# Patient Record
Sex: MALE | Race: WHITE | NOT HISPANIC OR LATINO | URBAN - METROPOLITAN AREA
[De-identification: names, ages, dates, MRNs, and addresses within clinical notes are randomized per-mention and may not be internally consistent; named-entity substitution may affect disease eponyms.]

---
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
## 2022-12-19 ENCOUNTER — HOSPITAL ENCOUNTER (OUTPATIENT)
Dept: PHYSICAL THERAPY | Age: 22
Setting detail: RECURRING SERIES
Discharge: HOME | End: 2022-12-22
Payer: COMMERCIAL

## 2022-12-19 PROCEDURE — 97161 PT EVAL LOW COMPLEX 20 MIN: CPT

## 2022-12-19 NOTE — THERAPY EVALUATION
on file. Mr. Rubén Martinez  has no past surgical history on file. Social History/Living Environment:   Lives With: Family  Type of Home: House     Prior Level of Function/Work/Activity:   Prior level of function: functioned indedpendently without limitaitons. Learning:   Does the patient/guardian have any barriers to learning?: No barriers  Will there be a co-learner?: No  What is the preferred language of the patient/guardian?: English  Is an  required?: No  How does the patient/guardian prefer to learn new concepts?: Listening; Reading; Demonstration     Fall Risk Scale: Pedro Total Score: 0  Pedro Fall Risk: Low (0-24)           OBJECTIVE   Observation/Posture: Rounded shoulders    Palpation: tender to palpation of the R subscapularis     ROM:       RIGHT LEFT    Shoulder flexion  155 160     Shoulder IR 35 53    Shoulder external rotation  82  85          Strength:       RIGHT  LEFT     Shoulder flexion  5/5 5/5     Shoulder ER  5/5  5/5    Shoulder IR 5/5 5/5    Scapular retraction  4/5  4/5          Functional Mobility:       RIGHT LEFT    Apley IR  WNL (winging of scapula) WNL     Apley ER  WNL  WNL    Push  --  --    Pull -- --    Carry  -- --           Joint Mobility:        RIGHT LEFT       normal normal             ASSESSMENT   Initial Assessment:  Upon PT assessment, Pt. Demonstrates reduced ROM in the R shoulder compared to the L side especially with internal rotation. Flexibility limitations are present in the pectoralis and subscapularis musculature. Myofascial restriction is present through the lateral scapular musculature and pectoralis musculature. Strength and motor control deficits can be seen in the scapular musculature and pt.  Requires verbal and manual cueing for appropriate performance of initial exercise program.  Pt. Will benefit from manual therapy, flexibility exercises and motor control/strengthening in the rotator cuff and scapular musculature to improve subjective complaints. HEP provided including stretching, scapular protraction, I's and T's  Problem List: (Impacting functional limitations): Body Structures, Functions, Activity Limitations Requiring Skilled Therapeutic Intervention: Decreased functional mobility ; Decreased ROM; Decreased body mechanics; Decreased strength; Decreased endurance; Decreased posture     Therapy Prognosis:   Therapy Prognosis: Good     Initial Assessment Complexity: Decision Making: Low Complexity    PLAN   Effective Dates: 12/19/2022 TO Plan of Care/Certification Expiration Date: 01/18/23   Frequency/Duration: Plan Frequency: 2x a week for 30 days   Interventions Planned (Treatment may consist of any combination of the following):          Goals: (Goals have been discussed and agreed upon with patient.)    Discharge Goals: Time Frame: 4 weeks  Pt. Will demonstrate > 50 degrees of R shoulder IR to improve ROM. Pt. Will demonstrate > 4+/5 MMT for scapular retraction to improve strength. Pt. Will be independent with HEP to prevent return of symptoms. Pt. Will complete full chest and back workout without pain or limitation to improve recreational capacity. Outcome Measure: Tool Used: Disabilities of the Arm, Shoulder and Hand (DASH) Questionnaire - Quick Version  Score:  Initial: 14/55  Most Recent: X/55 (Date: -- )   Interpretation of Score: The DASH is designed to measure the activities of daily living in person's with upper extremity dysfunction or pain. Each section is scored on a 1-5 scale, 5 representing the greatest disability. The scores of each section are added together for a total score of 55. Medical Necessity:   > Patient is expected to demonstrate progress in strength and range of motion to increase independence with ADL's and recreational capacity.   Reason For Services/Other Comments:  Patient will benefit from skilled PT to address impairments identified through evaluation and return to previous ADL and

## 2023-01-03 ENCOUNTER — HOSPITAL ENCOUNTER (OUTPATIENT)
Dept: PHYSICAL THERAPY | Age: 23
Setting detail: RECURRING SERIES
Discharge: HOME | End: 2023-01-06
Payer: COMMERCIAL

## 2023-01-03 PROCEDURE — 97110 THERAPEUTIC EXERCISES: CPT

## 2023-01-03 PROCEDURE — 97140 MANUAL THERAPY 1/> REGIONS: CPT

## 2023-01-03 NOTE — PROGRESS NOTES
Sondra [place]  : 2000  Primary: Kermitt Schlatter Sc ([facility])  Secondary:  [address] @ [facility] [address]  Phone: [phone]  Fax: [phone] Plan Frequency: 2x a week for 30 days    Plan of Care/Certification Expiration Date: 23      PT Visit Info:  Plan Frequency: 2x a week for 30 days  Plan of Care/Certification Expiration Date: 23     Visit Count:  2   OUTPATIENT PHYSICAL THERAPY:OP NOTE TYPE: Treatment Note 1/3/2023       Episode  }Appt Desk             Treatment Diagnosis:  Stiffness of Right Shoulder, Not elsewhere classified (M25.611)  Medical/Referring Diagnosis:  Right shoulder strain [Q84.654S]  Referring Physician:  Referral, Self  MD Orders:  N/A  Date of Onset:  No data recorded   Allergies:   Patient has no allergy information on record. Restrictions/Precautions:  Restrictions/Precautions: None  No data recorded     Interventions Planned (Treatment may consist of any combination of the following):    No data recorded     Subjective Comments: Pt. Reports good compliance with HEP over the past week. Initial:}     0/10Post Session:        0/10  Medications Last Reviewed:  1/3/2023  Updated Objective Findings:  None Today  Treatment   THERAPEUTIC EXERCISE: (30 minutes):    Exercises per grid below to improve mobility and strength. Required minimal visual, verbal, and manual cues to promote proper body alignment, promote proper body posture, and promote proper body mechanics. Progressed resistance, range, and repetitions as indicated. Date:  1/3/2023   Activity/Exercise Parameters   I's Y's, T's 3 x 10   Push up 3 x 5   Serratus rolls 50 reps   Self pectoralis release/pec stretch 4 minutes   Lower trapezius exercise 3 x 10   Sidelying shoulder ER 3 x 10   Serratus punch (15#) 3 x 10     Manual Therapy (15  Minutes): Manual techniques to facilitate improved motion and decrease pain.  (Used abbreviations; PNF - proprioceptive neuromuscular facilitation; a/p - anterior to posterior; p/a - posterior to anterior; cpa - central posterior anterior; upa -unilateral posterior anterior; SAL- superior anterior lateral glide; HVLAT - High Velocity Low Amplitude Thrust)  Soft tissue mobilization: R pectoralis, subscapularis, lateral scapular musculature. Joint mobilization: thoracic spine grade V supine   Joint mobilization: R rib manipulation T3/4    Dry Needling (5 minutes): R pectoralis major, subscapularis, latissimus dorsi. Treatment/Session Summary:    Treatment Assessment: Pt. Demonstrates signs of motor control deficits in the R scapular and rotator cuff musculature during therapeutic exercises today. Pt. Will benefit from strengthening and motor control exercises in the R UE to improve symptomatic complaints. Dry needling techniques are utilized today without complaints and minimal post treatment soreness. Communication/Consultation:  None today  Equipment provided today:  None  Recommendations/Intent for next treatment session: Next visit will focus on R UE motor control and strengthening. Total Treatment Billable Duration:  50 minutes total time.    Time In: 1430  Time Out: [address], PT       Charge Capture  }Post Session Pain  PT Visit Info  [facility] Portal  MD Guidelines  Scanned Media  Benefits  MyChart    Future Appointments   Date Time Provider Petrona Jacobs   1/5/2023  7:30 AM Luma Stevenson PT [facility] No Residual Tumor Seen Histology Text: There were no malignant cells seen in the sections examined.

## 2023-01-05 ENCOUNTER — HOSPITAL ENCOUNTER (OUTPATIENT)
Dept: PHYSICAL THERAPY | Age: 23
Setting detail: RECURRING SERIES
Discharge: HOME | End: 2023-01-08
Payer: COMMERCIAL

## 2023-01-05 PROCEDURE — 97110 THERAPEUTIC EXERCISES: CPT

## 2023-01-05 PROCEDURE — 97140 MANUAL THERAPY 1/> REGIONS: CPT

## 2023-01-05 NOTE — PROGRESS NOTES
Sigrid Turner  : 2000  Primary: Emil Chou ([facility]  Secondary:  [address] @ [place] [address]  Phone: [phone]  Fax: [phone] Plan Frequency: 2x a week for 30 days    Plan of Care/Certification Expiration Date: 23      PT Visit Info:  Plan Frequency: 2x a week for 30 days  Plan of Care/Certification Expiration Date: 23     Visit Count:  3   OUTPATIENT PHYSICAL THERAPY:OP NOTE TYPE: Treatment Note 2023       Episode  }Appt Desk             Treatment Diagnosis:  Stiffness of Right Shoulder, Not elsewhere classified (M25.611)  Medical/Referring Diagnosis:  Right shoulder strain [W23.409P]  Referring Physician:  Referral, Self  MD Orders:  N/A  Date of Onset:  No data recorded   Allergies:   Patient has no allergy information on record. Restrictions/Precautions:  Restrictions/Precautions: None  No data recorded     Interventions Planned (Treatment may consist of any combination of the following):    No data recorded     Subjective Comments: Pt. Denies new complaints this week and mild soreness following last PT session      Initial:}     0/10Post Session:        0/10  Medications Last Reviewed:  2023  Updated Objective Findings:  None Today  Treatment   THERAPEUTIC EXERCISE: (35 minutes):    Exercises per grid below to improve mobility and strength. Required minimal visual, verbal, and manual cues to promote proper body alignment, promote proper body posture, and promote proper body mechanics. Progressed resistance, range, and repetitions as indicated.    Date:  2023   Activity/Exercise Parameters   I's Y's, T's 3 x 10   Push up --   Serratus rolls --   Self pectoralis release/pec stretch --   Lower trapezius exercise 3 x 10   Sidelying shoulder ER (2#) 3 x 10   Serratus punch (15#) 3 x 10   Serratus walk out on therapy ball 5 minutes   Single UE cable row (30#) 3 x 10   Single UE cable press (20#) 3 x 10   Scapular retraction with ER (orange band) 3 x 10     Manual Therapy (10  Minutes): Manual techniques to facilitate improved motion and decrease pain. (Used abbreviations; PNF - proprioceptive neuromuscular facilitation; a/p - anterior to posterior; p/a - posterior to anterior; cpa - central posterior anterior; upa -unilateral posterior anterior; SAL- superior anterior lateral glide; HVLAT - High Velocity Low Amplitude Thrust)  Soft tissue mobilization: R pectoralis, subscapularis, lateral scapular musculature. Joint mobilization: thoracic spine grade V supine (NOT PERFORMED)  Joint mobilization: R rib manipulation T3/4 (NOT PERFORMED)    Dry Needling (0 minutes): R pectoralis major, subscapularis, latissimus dorsi. (NOT PERFORMED)    Treatment/Session Summary:    Treatment Assessment: Pt. Tolerates upper quarter motor control and strengthening exercises today without complaints. R upper trapezius compensation remains present with upper quarter exercises. Communication/Consultation:  None today  Equipment provided today:  None  Recommendations/Intent for next treatment session: Next visit will focus on R UE motor control and strengthening. Total Treatment Billable Duration:  45 minutes total time.    Time In: 0730  Time Out: 0830    Kodak Lantigua, PT       Charge Capture  }Post Session Pain  PT Visit Info  [facility] Portal  MD Guidelines  Scanned Media  Benefits  MyChart    Future Appointments   Date Time Provider Petrona Jacobs   1/12/2023  1:30 PM ROLANDO Cervantes [facility] [place][note 2]   1/16/2023  8:30 AM ROLANDO Cervantes [facility] [place]

## 2023-01-12 ENCOUNTER — APPOINTMENT (OUTPATIENT)
Dept: PHYSICAL THERAPY | Age: 23
End: 2023-01-12
Payer: COMMERCIAL

## 2023-01-13 ENCOUNTER — HOSPITAL ENCOUNTER (OUTPATIENT)
Dept: PHYSICAL THERAPY | Age: 23
Setting detail: RECURRING SERIES
Discharge: HOME | End: 2023-01-16
Payer: COMMERCIAL

## 2023-01-13 PROCEDURE — 97110 THERAPEUTIC EXERCISES: CPT

## 2023-01-13 PROCEDURE — 97140 MANUAL THERAPY 1/> REGIONS: CPT

## 2023-01-13 NOTE — PROGRESS NOTES
Bandar Meir  : 2000  Primary: tSeph Chou ([facility])  Secondary:  [address] @ [place]  [facility] [address]  Phone: [phone]  Fax: [phone] Plan Frequency: 2x a week for 30 days    Plan of Care/Certification Expiration Date: 23      PT Visit Info:  Plan Frequency: 2x a week for 30 days  Plan of Care/Certification Expiration Date: 23     Visit Count:  4   OUTPATIENT PHYSICAL THERAPY:OP NOTE TYPE: Treatment Note 2023       Episode  }Appt Desk             Treatment Diagnosis:  Stiffness of Right Shoulder, Not elsewhere classified (M25.611)  Medical/Referring Diagnosis:  Right shoulder strain [G61.112X]  Referring Physician:  Referral, Self  MD Orders:  N/A  Date of Onset:  No data recorded   Allergies:   Patient has no allergy information on record. Restrictions/Precautions:  Restrictions/Precautions: None  No data recorded     Interventions Planned (Treatment may consist of any combination of the following):    No data recorded     Subjective Comments: Pt states he is better but feels like he has plateaued a bit     Initial:}     0/10Post Session:        0/10  Medications Last Reviewed:  2023  Updated Objective Findings:  None Today  Treatment   THERAPEUTIC EXERCISE: (35 minutes):    Exercises per grid below to improve mobility and strength. Required minimal visual, verbal, and manual cues to promote proper body alignment, promote proper body posture, and promote proper body mechanics. Progressed resistance, range, and repetitions as indicated.    Date:  2023   Activity/Exercise Parameters   I's Y's, T's 3 x 10 with tactile cueing    Push up --   Serratus rolls --   Self pectoralis release/pec stretch --   Lower trapezius exercise 3 x 10   Sidelying shoulder ER (5#) 3 x 10   Serratus punch (15#) 3 x 10   Serratus walk out on therapy ball    Single UE cable row (30#)    Single UE cable press (20#)    Scapular retraction with ER (orange band)    Lat stretch 3 x 20 sec, serratus hugs with green sports cord 3 x 10,   Manual Therapy (10  Minutes): Manual techniques to facilitate improved motion and decrease pain. (Used abbreviations; PNF - proprioceptive neuromuscular facilitation; a/p - anterior to posterior; p/a - posterior to anterior; cpa - central posterior anterior; upa -unilateral posterior anterior; SAL- superior anterior lateral glide; HVLAT - High Velocity Low Amplitude Thrust)  Soft tissue mobilization: R pectoralis, subscapularis, lateral scapular musculature. Joint mobilization: thoracic spine grade V supine (NOT PERFORMED)  Joint mobilization: R rib manipulation T3/4 (NOT PERFORMED)    Dry Needling (0 minutes): R pectoralis major, subscapularis, latissimus dorsi. (NOT PERFORMED)    Treatment/Session Summary:    Treatment Assessment: Pt doing well with strengthening. He continues to have some substitution of upper trap and reports tightness in shoulder. Communication/Consultation:  None today  Equipment provided today:  None  Recommendations/Intent for next treatment session: Next visit will focus on R UE motor control and strengthening. Total Treatment Billable Duration:  45 minutes total time.    Time In: 1200  Time Out: 1255    Orion Santiago PT       Charge Capture  }Post Session Pain  PT Visit [facility] Portal  MD Guidelines  Scanned Media  Benefits  MyChart    Future Appointments   Date Time Provider Petrona Jacobs   1/16/2023  8:30 AM Martha Ruiz PT [facility]

## 2023-01-16 ENCOUNTER — HOSPITAL ENCOUNTER (OUTPATIENT)
Dept: PHYSICAL THERAPY | Age: 23
Setting detail: RECURRING SERIES
Discharge: HOME | End: 2023-01-19
Payer: COMMERCIAL

## 2023-01-16 PROCEDURE — 97140 MANUAL THERAPY 1/> REGIONS: CPT

## 2023-01-16 PROCEDURE — 97110 THERAPEUTIC EXERCISES: CPT

## 2023-01-16 NOTE — PROGRESS NOTES
Stuart Matson  : 2000  Primary: Clive Chou ([facility])  Secondary:  [address] @ [place] [address]  Phone: [phone]  Fax: [phone] Plan Frequency: 2x a week for 30 days    Plan of Care/Certification Expiration Date: 23      PT Visit Info:  Plan Frequency: 2x a week for 30 days  Plan of Care/Certification Expiration Date: 23     Visit Count:  5   OUTPATIENT PHYSICAL THERAPY:OP NOTE TYPE: Treatment Note 2023       Episode  }Appt Desk             Treatment Diagnosis:  Stiffness of Right Shoulder, Not elsewhere classified (M25.611)  Medical/Referring Diagnosis:  Right shoulder strain [M08.279Z]  Referring Physician:  Referral, Self  MD Orders:  N/A  Date of Onset:  No data recorded   Allergies:   Patient has no allergy information on record. Restrictions/Precautions:  Restrictions/Precautions: None  No data recorded     Interventions Planned (Treatment may consist of any combination of the following):    No data recorded     Subjective Comments: Pt reports some progress with exercise but remains limited during pressing/pulling activities in the gym using the R UE. Initial:}     0/10Post Session:        010  Medications Last Reviewed:  2023  Updated Objective Findings:   R shoulder IR at 90 degrees of abduction is 43 degrees today. Treatment   THERAPEUTIC EXERCISE: (35 minutes):    Exercises per grid below to improve mobility and strength. Required minimal visual, verbal, and manual cues to promote proper body alignment, promote proper body posture, and promote proper body mechanics. Progressed resistance, range, and repetitions as indicated.    Date:  2023   Activity/Exercise Parameters   I's Y's, T's 3 x 10 with tactile cueing  (1#)   Push up 3 x 6   Serratus rolls --   Self pectoralis release/pec stretch --   Lower trapezius exercise 3 x 10   Sidelying shoulder ER (5#) 3 x 10   Serratus punch (15#) 3 x 10 Serratus walk out on therapy ball    Single UE cable row (30#) 3 x 10   Single UE cable press (20#)    Scapular retraction with ER (orange band)    plank 3 minutes   Bear crawl 3 minutes   Lat stretch 3 x 20 sec, serratus hugs with green sports cord 3 x 10,   Manual Therapy (10  Minutes): Manual techniques to facilitate improved motion and decrease pain. (Used abbreviations; PNF - proprioceptive neuromuscular facilitation; a/p - anterior to posterior; p/a - posterior to anterior; cpa - central posterior anterior; upa -unilateral posterior anterior; SAL- superior anterior lateral glide; HVLAT - High Velocity Low Amplitude Thrust)  Soft tissue mobilization: R pectoralis, subscapularis, lateral scapular musculature. Joint mobilization: thoracic spine grade V supine (NOT PERFORMED)  Joint mobilization: R rib manipulation T3/4 (NOT PERFORMED)    Dry Needling (5 minutes): R subscapularis, latissimus dorsi. Treatment/Session Summary:    Treatment Assessment: Pt demonstrates improved R shoulder IR since initial evaluation. Evaluation of pressing activities, including push up, reveals improved scapular mechanics that deteriorate with increased repetitions. Pt. Is making expected progress with scpaular and rotator cuff strengthening. Pt. Will require physician referral for continued PT at this time. Pt. Is advised to continue HEP and consult with provider if he would like to continued current PT program.      Communication/Consultation:  None today  Equipment provided today:  None  Recommendations/Intent for next treatment session: Next visit will focus on R UE motor control and strengthening. Total Treatment Billable Duration:  50 minutes total time. Time In: 0830  Time Out: 0930    Geneva Cole PT       Charge Capture  }Post Session Pain  PT Visit Info  [employer] Portal  MD Guidelines  Scanned Media  Benefits  MyChart    No future appointments.

## 2023-07-27 ENCOUNTER — HOSPITAL ENCOUNTER (OUTPATIENT)
Dept: PHYSICAL THERAPY | Age: 23
Setting detail: RECURRING SERIES
Discharge: HOME | End: 2023-07-30
Payer: COMMERCIAL

## 2023-07-27 PROCEDURE — 97110 THERAPEUTIC EXERCISES: CPT

## 2023-07-27 PROCEDURE — 97161 PT EVAL LOW COMPLEX 20 MIN: CPT

## 2023-07-28 NOTE — PROGRESS NOTES
Maik Mendiola  : 2000  Primary: Arsen Cross Sc ([facility])  Secondary:  [address] @ [employer] [place]  [address] [address]  Phone: [phone]  Fax: [phone] Plan Frequency: 2-3 visits for review and update HEP    Plan of Care/Certification Expiration Date: 23      >PT Visit Info:  Plan Frequency: 2-3 visits for review and update HEP  Plan of Care/Certification Expiration Date: 23  Total # of Visits to Date: 1      Visit Count:  1    OUTPATIENT PHYSICAL THERAPY:OP NOTE TYPE: OP Note Type: Treatment Note 2023       Episode  }Appt Desk             Treatment Diagnosis:  Pain in Right Shoulder (M25.511)  Medical/Referring Diagnosis:  Pain in right shoulder [M25.511]  Other chronic pain [G89.29]  Referring Physician:  Nicol Colbert MD MD Orders:  PT Eval and Treat   Date of Onset: last year   Allergies:   Patient has no allergy information on record. Restrictions/Precautions:  Restrictions/Precautions: None  No data recorded   Interventions Planned (Treatment may consist of any combination of the following):    Current Treatment Recommendations: Home exercise program; Strengthening; Manual; Dry needling     >Subjective Comments: See Eval     >Initial:     0 /10>Post Session:    0    /10  Medications Last Reviewed:  2023  Updated Objective Findings:  See evaluation note from today  Treatment   THERAPEUTIC EXERCISE: (15 minutes):    Exercises to improve mobility and strength. Instructed patient in prone middle and lower trap with verbal and tactile cueing for scapula positioning. Attempted a sitting lat stretch with elbows at 90 deg on table and shoulder flexion but not able to feel a lat stretch so did not give as HEP. Also discussed adding rotator cuff strength to gym program at the start of his working out to help with strength and mobility of R shoulder.      Treatment/Session Summary:    >Treatment Assessment: Good demonstration

## 2023-07-28 NOTE — THERAPY EVALUATION
Adam Evangelista  : 2000  Primary: Moon Chou ([facility])  Secondary:  [address] @ [address] [place] [id number] [address] [facility] [address]  Phone: [phone]  Fax: [phone] Plan Frequency: 2-3 visits for review and update HEP    Plan of Care/Certification Expiration Date: 23      PT Visit Info:  Plan Frequency: 2-3 visits for review and update HEP  Plan of Care/Certification Expiration Date: 23  Total # of Visits to Date: 1      Visit Count:  1                OUTPATIENT PHYSICAL THERAPY:             OP NOTE TYPE: Initial Assessment 2023               Episode (R shoulder pain) Appt Desk         Treatment Diagnosis:  Pain in Right Shoulder (M25.511)  Medical/Referring Diagnosis:  Pain in right shoulder [M25.511]  Other chronic pain [G89.29]  Referring Physician:  Velia Sheffield MD MD Orders:  PT Eval and Treat   Return MD Appt:  TBD  Date of Onset:     Last 2022  Allergies:  Patient has no allergy information on record. Restrictions/Precautions:      none     Medications Last Reviewed:  2023     SUBJECTIVE   History of Injury/Illness (Reason for Referral):  Pt reports no pain in the shoulder. He feels like he has decreased R shoulder mobility. He feels like he has muscle tightness and the right shoulder feels different than the L shoulder with his gym exercises. Patient Stated Goal(s):  \"decrease tension, R shoulder feel like L shoulder with working out\"  Initial:     0 10 Post Session:     0 /10  Past Medical History/Comorbidities:   Mr. Keara Doherty  has no past medical history on file. Mr. Keara Doherty  has no past surgical history on file.   Social History/Living Environment:   Lives With: Family  Type of Home: House     Prior Level of Function/Work/Activity:   Prior level of function: Independent  Occupation: Full time employment  Type of Occupation:        Learning:   Does the patient/guardian have any barriers to learning?: No

## 2023-08-16 ENCOUNTER — HOSPITAL ENCOUNTER (OUTPATIENT)
Dept: PHYSICAL THERAPY | Age: 23
Setting detail: RECURRING SERIES
Discharge: HOME | End: 2023-08-19
Payer: COMMERCIAL

## 2023-08-16 PROCEDURE — 97110 THERAPEUTIC EXERCISES: CPT

## 2023-08-16 NOTE — PROGRESS NOTES
Bette Quiroz  : 2000  Primary: Beba Gandhi Sc ([facility])  Secondary:  [address] @ [employer]  [address] [address]  Phone: [phone]  Fax: [phone] Plan Frequency: 2-3 visits for review and update HEP    Plan of Care/Certification Expiration Date: 23      >PT Visit Info:  Plan Frequency: 2-3 visits for review and update HEP  Plan of Care/Certification Expiration Date: 23  Total # of Visits to Date: 2      Visit Count:  2    OUTPATIENT PHYSICAL THERAPY:OP NOTE TYPE: OP Note Type: Treatment Note 2023       Episode  }Appt Desk             Treatment Diagnosis:  Pain in Right Shoulder (M25.511)  Medical/Referring Diagnosis:  Pain in right shoulder [M25.511]  Other chronic pain [G89.29]  Referring Physician:  Loren Junior MD MD Orders:  PT Eval and Treat   Date of Onset: last 2022  Allergies:   Patient has no allergy information on record. Restrictions/Precautions:  Restrictions/Precautions: None  No data recorded   Interventions Planned (Treatment may consist of any combination of the following):    Current Treatment Recommendations: Home exercise program; Strengthening; Manual; Dry needling     >Subjective Comments:   Pt reports he has been trying to do the exercises at the gym.  >Initial:     0 /10>Post Session:    0    /10  Medications Last Reviewed:  2023  Updated Objective Findings:   Treatment   THERAPEUTIC EXERCISE: (38 minutes):    Exercises to improve mobility and strength. Moderate verbal and tactile cues on scapula for positioning during exercises.      Date:  23 Date:   Date:     Activity/Exercise Parameters Parameters Parameters   Prone middle trap 3x10     Prone lower trap Elbow bent 2x5  Elbow straight 2x5     Prone ER 2x10     Tband ER atneutral Green 10     T-band ER at 90 deg abd Green 10     Serratus on wall with band Yellow 5x     planks 30\"     Supine serratus press 10# 10x

## 2023-08-25 ENCOUNTER — HOSPITAL ENCOUNTER (OUTPATIENT)
Dept: PHYSICAL THERAPY | Age: 23
Setting detail: RECURRING SERIES
Discharge: HOME | End: 2023-08-28
Payer: COMMERCIAL

## 2023-08-25 PROCEDURE — 97110 THERAPEUTIC EXERCISES: CPT

## 2023-08-25 NOTE — PROGRESS NOTES
Inderjit Vasquez  : 2000  Primary: Maya Nava Sc ([facility])  Secondary:  [address] @ [employer]  [address] [facility] [address]  Phone: [phone]  Fax: [phone] Plan Frequency: 2-3 visits for review and update HEP    Plan of Care/Certification Expiration Date: 23      >PT Visit Info:  Plan Frequency: 2-3 visits for review and update HEP  Plan of Care/Certification Expiration Date: 23  Total # of Visits to Date: 3  Progress Note Counter: 3      Visit Count:  3    OUTPATIENT PHYSICAL THERAPY:OP NOTE TYPE: OP Note Type: Treatment Note 2023       Episode  }Appt Desk             Treatment Diagnosis:  Pain in Right Shoulder (M25.511)  Medical/Referring Diagnosis:  Pain in right shoulder [M25.511]  Other chronic pain [G89.29]  Referring Physician:  Sadia Mccormick MD MD Orders:  PT Eval and Treat   Date of Onset: last year   Allergies:   Patient has no allergy information on record. Restrictions/Precautions:  Restrictions/Precautions: None  No data recorded   Interventions Planned (Treatment may consist of any combination of the following):    Current Treatment Recommendations: Home exercise program; Strengthening; Manual; Dry needling     >Subjective Comments:  Says he continues to feels his R shoulder is not feeling like it is positioned and engaged correctly when he does strength training and with raising his arm overhead. Has been working on the [employer] provided. The band walll walks are chellenging. No significant pain to report. Feels over work and control of the anterior deltoid  >Initial:     0 /10>Post Session:    0 /10     Medications Last Reviewed:  2023  Updated Objective Findings:   Observation/Orthostatic Postural Assessment: Standing alignment: well developed. Palpation: R shoulder girdle non-tender. Tenderness noted to spinous process at T3/4 level and appears to have rotated segment here.    ROM:   Essentially full shoulder

## 2023-08-31 ENCOUNTER — APPOINTMENT (OUTPATIENT)
Dept: PHYSICAL THERAPY | Age: 23
End: 2023-08-31
Payer: COMMERCIAL

## 2023-09-06 ENCOUNTER — HOSPITAL ENCOUNTER (OUTPATIENT)
Dept: PHYSICAL THERAPY | Age: 23
Setting detail: RECURRING SERIES
Discharge: HOME | End: 2023-09-09
Payer: COMMERCIAL

## 2023-09-06 PROCEDURE — 97110 THERAPEUTIC EXERCISES: CPT

## 2023-09-06 NOTE — PROGRESS NOTES
exercises done today. Functional weakness noted to R cuff and deep scapular stabilizers on R. Mild shortness to R biceps with median nerve tension position. This appears to be musculoskeletal vs adverse neural tension. Good reception and understanding to the exercises done today. No pain reported R shoulder. Communication/Consultation:  None today  Equipment provided today:  None  Recommendations/Intent for next treatment session: Review the new HEP and continue to progress the R brachial chain muscle pattern facilitation and shoulder girdle strength and control.      >Total Treatment Billable Duration:  50 minutes  Time In: 1520  Time Out: 107 [facility], PT       Charge Capture  }Post Session Pain  PT Visit Info  [employer] Portal  MD Guidelines  Scanned Media  Benefits  MyChart    Future Appointments   Date Time Provider [address]   9/11/2023  3:15 PM Lovely Scanlon, PT [facility]

## 2023-09-11 ENCOUNTER — HOSPITAL ENCOUNTER (OUTPATIENT)
Dept: PHYSICAL THERAPY | Age: 23
Setting detail: RECURRING SERIES
Discharge: HOME | End: 2023-09-14
Payer: COMMERCIAL

## 2023-09-11 PROCEDURE — 97110 THERAPEUTIC EXERCISES: CPT

## 2023-09-11 NOTE — PROGRESS NOTES
degrees flexion, forward elevation, horizontal abduction in pec sternal and clavicular head stretch positions, 3\"x10 each; reciprocal inhibition stretch to IR at 90 deg abd, 10\"x5-6; reciprocal inhibition and contract/relax stretching to lat dorsi on R, 10\"x5-6 each; assisted Active Isolated Stretch to biceps/median nerve with moving component at elbow and at wrist, 3\"x10 each. Active muscle balance move in standing with back to wall, bilat ER and IR at 90-deg abd, and reverse wall slide with self assist to humeral neutral rotation, 3\"x10 each; and lat dorsi prayer stretch reviewed. Emphasis on stable spine/scapula with mobile shoulder and disassociation of movement at shoulder with these. And cues and instruction for HEP practice. Good understanding post practice. Exercises for shoulder girdle strength and scapulohumeral mechanics as per grid. Exercises modified as indicated, and manual assist for positioning and control. Verbal review of cable IR in shoulder neutral with long hold/slow eccnetrics and cable press/chest flys with emphasis on R scapular control and positioning through movement. Appears to have good understanding with these. Date:  8-16-23 Date:  8/25/23 Date:  9/6/23 Date  9/1123    Activity/Exercise Parameters Parameters Parameters     Shoulder motion/flexibility    As above    Prone middle trap 3x10 - - Unilat  3# 1x10 with manual guiding, cues    Prone lower trap Elbow bent 2x5  Elbow straight 2x5 - - Unilat  3# 1x5,   2# [id number] manual guiding, cues ea    Prone ER/IR ER: 2x10 - - ER and IR  3# 1x15 ea with manual scap stab    Tband ER at neutral Green 10 - - -    T-band ER at 90 deg abd Green 10 - - -    Serratus on wall with band Yellow 5x Forearm wall walk red tubing  x5 - -    planks 30\" - - -    Supine serratus press 10# 10x - - -    [employer]okinematics  Quadruped respiratory belly lift,   Standing Trunk Around's as above Quadruped respiratory belly lift;   Added 90/90 hip lift progression

## 2023-09-29 ENCOUNTER — HOSPITAL ENCOUNTER (OUTPATIENT)
Dept: PHYSICAL THERAPY | Age: 23
Setting detail: RECURRING SERIES
End: 2023-09-29
Payer: COMMERCIAL

## 2023-09-29 PROCEDURE — 97110 THERAPEUTIC EXERCISES: CPT

## 2023-09-29 NOTE — THERAPY RECERTIFICATION
palpable click noted with scour test. Joint [employer] testing: tender to T3/4/5 PA glide. ASSESSMENT   Re-Certification Assessment:  Bre Pierce has attended 6 treatments to date for his complaint of R shoulder dyskinesis. This is doing better and he feels he is moving the R arm and shoulder with better control at the shoulder. He continue to have mild weakness to subscapularis, a positive scour test, possible thoracic segmental dysfunction, and what appears to be mild adverse neural dynamic tension to the UE. He has been compliant with his HEP, and report improving control with his strength training. He would like to have a few more follow up appointments to re-check and progress the corrective exercises for self-management. Problem List: (Impacting functional limitations): Body Structures, Functions, Activity Limitations Requiring Skilled Therapeutic Intervention: Decreased strength; Decreased functional mobility      Therapy Prognosis:   Therapy Prognosis: Good     Initial Assessment Complexity:   Decision Making: Low Complexity    PLAN   Effective Dates: 9/29/23 TO Plan of Care/Certification Expiration Date: 10/29/23     Frequency/Duration: Plan Frequency: 2 visits in 4 weeks     Interventions Planned (Treatment may consist of any combination of the following):    Current Treatment Recommendations: Strengthening; ROM; Home exercise program     Goals: (Goals have been discussed and agreed upon with patient.)  Discharge Goals: Time Frame: 60 days  Pt will be independent with HEP. Progressing and ongoing 9/29/23  Pt will increase middle and lower trap strength to 5/5 for ADLs. Progressing and ongoing 9/29/23       Outcome Measure: Tool Used: Disabilities of the Arm, Shoulder and Hand (DASH) Questionnaire - Quick Version  Score:  Initial: 15/55     Interpretation of Score: The DASH is designed to measure the activities of daily living in person's with upper extremity dysfunction or pain.   Each

## 2023-09-29 NOTE — PROGRESS NOTES
Audie Cool  : 2000  Primary: Ashok Devlin Sc ([facility])  Secondary:  [address] @ [employer]  [address] [address] [address] [address]  Phone: [phone]  Fax: [phone] Plan Frequency: 2-3 visits for review and update HEP    Plan of Care/Certification Expiration Date: 23      >PT Visit Info:  Plan Frequency: 2-3 visits for review and update HEP  Plan of Care/Certification Expiration Date: 23  Total # of Visits to Date: 6  Progress Note Counter: 6      Visit Count:  6    OUTPATIENT PHYSICAL THERAPY: OP Note Type: Treatment Note 2023       Episode  }Appt Desk             Treatment Diagnosis:  Pain in Right Shoulder (M25.511)  Medical/Referring Diagnosis:  Pain in right shoulder [M25.511]  Other chronic pain [G89.29]  Referring Physician:  Breezy Arboleda MD MD Orders:  PT Eval and Treat   Date of Onset: last year   Allergies:   Patient has no allergy information on record. Restrictions/Precautions:  Restrictions/Precautions: None  No data recorded   Interventions Planned (Treatment may consist of any combination of the following):    Current Treatment Recommendations: Home exercise program; Strengthening; Manual; Dry needling     >Subjective Comments:  Says he feels he is doing better with the HEP, and can feel his shoulder in the right positions with these. Still difficult to maintain correct position with the heavier weight training. Has been doing the cable ER/IR, quadruped spin flex without issuse. Working on the [employer] abd move in quadruped. This is better than the scaption move. >Initial:     0 /10>Post Session:    0 /10     Medications Last Reviewed:  2023    Updated Objective Findings:   Strength: R Shoulder IR: subscapularis weakness noted on R, especially in end 1/3 of the range of IR.        Treatment   THERAPEUTIC EXERCISE: (30 minutes): Reviewed and performed exercises of HEP, including quadruped respiratory belly lift with R UE stab only 1x 10 breath hold; quadruped horiz abd and instruction for scaption in quadruped vs prone; active shoulder IR sleeper stretch with modification with arm position and decreased over-pressure;  active median nerve tensioner with modification for moving component at elbow, scapula, and head; added ulnar nerve tensioner. Good return demonstration and understanding of all these. HEP: He is to continue with his HEP as we practiced and worked on today. He can incorporate the brachial chain and cuff exercise at the start of weight training sessions,and can try brachial chain facilitation between sets. He is to practice varied  positions with bar pressing and pulling. He verbalizes understanding. Treatment/Session Summary:    >Treatment Assessment: He appears to be doing better with R shoulder girdle mechanics and cuff facilitation. Better active control with corrective exercises, and he reports translation to control with resistance exercises. Continued weakness to subscapularis. No more than mild stiffness to IR. Possible adverse neural tension to median and ulnar nerves. There appears to be ulnar nerve snapping at the medial elbow. Communication/Consultation:  None today  Equipment provided today:  None  Recommendations/Intent for next treatment session: He is to continue with his HEP and strength regimen the next 2-3 weeks then follow again for a review if need. >Total Treatment Billable Duration:  30 minutes  Time In: 1355  Time Out: 300 [facility], PT       Charge Capture  }Post Session Pain  PT Visit Info  [employer] Portal  MD Guidelines  Scanned Media  Benefits  MyChart    No future appointments.

## 2023-10-13 ENCOUNTER — HOSPITAL ENCOUNTER (OUTPATIENT)
Dept: PHYSICAL THERAPY | Age: 23
Setting detail: RECURRING SERIES
Discharge: HOME | End: 2023-10-16
Payer: COMMERCIAL

## 2023-10-13 PROCEDURE — 97110 THERAPEUTIC EXERCISES: CPT

## 2023-10-13 NOTE — PROGRESS NOTES
Pain  PT Visit Info  [employer] Portal  MD Guidelines  Scanned Media  Benefits  MyChart    No future appointments.

## 2023-11-09 ENCOUNTER — HOSPITAL ENCOUNTER (OUTPATIENT)
Dept: PHYSICAL THERAPY | Age: 23
Setting detail: RECURRING SERIES
End: 2023-11-09
Payer: COMMERCIAL

## 2023-11-09 NOTE — PROGRESS NOTES
Fan January  : 2000  Primary: Mary Bowers Sc ([facility])  Secondary:  [address] @ [employer]  [address]  [address] [facility] [address]  Phone: [phone]  Fax: [phone] Plan Frequency: 2 visits in 4 weeks    Plan of Care/Certification Expiration Date: 10/29/23      >PT Visit Info:  Plan Frequency: 2 visits in 4 weeks  Plan of Care/Certification Expiration Date: 10/29/23  Total # of Visits to Date: 7  Progress Note Counter: 7      Visit Count:  8    OUTPATIENT PHYSICAL THERAPY: Cancellation 2023       Episode  }Appt Desk             Treatment Diagnosis:  Pain in Right Shoulder (M25.511)  Medical/Referring Diagnosis:  Pain in right shoulder [M25.511]  Other chronic pain [G89.29]  Referring Physician:  Lindy Salinas MD MD Orders:  PT Eval and Treat   Date of Onset: last 2022  Allergies:   Patient has no allergy information on record. Restrictions/Precautions:  Restrictions/Precautions: None  No data recorded   Interventions Planned (Treatment may consist of any combination of the following):    Current Treatment Recommendations: Strengthening; ROM; Home exercise program       Fan January called to cancel his appointment today due to illness. I will re-check him on his next visit.        Viktoriya Wasserman PT       Charge Capture  }Post Session Pain  PT Visit Info  [employer] Portal  MD Guidelines  Scanned Media  Benefits  MyChart    Future Appointments   Date Time Provider [address]   2023  3:15 PM [facility]   2023  2:30 PM Viktoriya Wasserman PT [facility]

## 2023-11-17 ENCOUNTER — HOSPITAL ENCOUNTER (OUTPATIENT)
Dept: PHYSICAL THERAPY | Age: 23
Setting detail: RECURRING SERIES
Discharge: HOME | End: 2023-11-20
Payer: COMMERCIAL

## 2023-11-17 PROCEDURE — 97110 THERAPEUTIC EXERCISES: CPT

## 2023-11-17 NOTE — THERAPY RECERTIFICATION
Reece Wilson  : 2000  Primary: Maria C Arora Sc ([facility])  Secondary:  [address] @ [address] [address] [address] [place], [place] [address] [facility] [address]  Phone: [phone]  Fax: [phone] Plan Frequency: 2-4 additional visits    Plan of Care/Certification Expiration Date: 24      PT Visit Info:  Plan Frequency: 2-4 additional visits  Plan of Care/Certification Expiration Date: 24  Total # of Visits to Date: 8  Progress Note Counter: 8      Visit Count: 8               OUTPATIENT PHYSICAL THERAPY:             OP NOTE TYPE: Recertification                Episode (R shoulder pain) Appt Desk         Treatment Diagnosis:  Pain in Right Shoulder (M25.511)  Medical/Referring Diagnosis:  Pain in right shoulder [M25.511]  Other chronic pain [G89.29]  Referring Physician:  Leodan Tee MD MD Orders:  PT Eval and Treat   Return MD Appt:  TBD  Date of Onset:     Last 2022  Allergies:  Patient has no allergy information on record. Restrictions/Precautions:      none         OBJECTIVE   Pain: 0/10 R shoulder girdle  Observation/Orthostatic Postural Assessment: Standing alignment: well developed; mild forward head, forward shoulder, R>L with anterior humeral head; mild increased R upper trap tone. Palpation:  Tenderness noted to spinous process at T3/4 level and appears to have rotated segment; tender to R subscapularis. ROM:  PROM RUE (degrees)  R Shoulder Flex  (0-180): 170 (forward elevation)  R Shoulder ABduction (0-180): 100 (with scapula stabilized)  R Shoulder Int Rotation  (0-70): 60 (stabilized at 45 abd, 45 at 90 deg abd). Clunk felt with stabilized IR more at 90 deg abd vs lower angles.   R Shoulder Ext Rotation  (0-90): 80 (in shoulder neutral, 95 at 45 and 90 deg abd)  AROM RUE (degrees)  R Shoulder Flexion (0-180): 165 (forward elevation)  R Shoulder Int Rotation  (0-70): 45 (at 90 deg abd; to T7 behind back)  R Shoulder Ext Rotation (0-90): 80

## 2023-11-17 NOTE — PROGRESS NOTES
(0-180): 100 (with scapula stabilized)  R Shoulder Int Rotation  (0-70): 60 (stabilized at 45 abd, 45 at 90 deg abd). Clunk felt with stabilized IR more at 90 deg abd vs lower angles. R Shoulder Ext Rotation  (0-90): 80 (in shoulder neutral, 95 at 45 and 90 deg abd)  AROM RUE (degrees)  R Shoulder Flexion (0-180): 165 (forward elevation)  R Shoulder Int Rotation  (0-70): 45 (at 90 deg abd; to T7 behind back)  R Shoulder Ext Rotation (0-90): 80 (by side, 100 at 90 deg abd)  Strength:   R Shoulder: strong and painless to all, except IR with mild weakness with Soft Tissue Differential testing; manual testing Rhomboid; 5; Mid Trap: 4; Lower Trap: 5; Serratus Ant: 5; Deltoid: 5 all ways; ER: 5 in neutral and 90-deg abd; IR: 4+ in shoulder neutral, 5- at 90-deg abd  Special Tests: R shoulder crank and clunk test postive; belly press and lift off tests with weakness vs L. Treatment   THERAPEUTIC EXERCISE: (50 minutes): Exercises for shoulder girdle strength and stabilization and performance with emphasis on technique for shoulder protection:  Reviewed and performed the prone on ground AROM UE lift for lower trap, middle trap with elbow extended and elbow flexed 90-deg. Tall plank shoulder touches x5 each; tall plank to rotation x5 each way. Shoulder ER in plane of scapula 10# R x9, L x15;  Shoulder IR in neutral 20# cable R x8, L x15; belly press cable IR 10# x10 each;  Dumbbell bench press, bilat, 40# x10, 50# x8, 65# x8; 75# x5; also trial with close    Dumbbell seated shoulder press, bilat, 40# x10 each done with wide  and altered to close . Cues for controlled shoulder ROM with limited extension when loaded, and altered arm position/ to decreased anterior shoulder strain. Good return demonstration and understanding. HEP: He is to continue with his HEP for cuff activation, strength for scapular and cuff facilitation as well as resistive strength routine with modifications as discussed today.

## 2023-12-22 ENCOUNTER — HOSPITAL ENCOUNTER (OUTPATIENT)
Dept: PHYSICAL THERAPY | Age: 23
Setting detail: RECURRING SERIES
Discharge: HOME | End: 2023-12-25
Payer: COMMERCIAL

## 2023-12-22 PROCEDURE — 97110 THERAPEUTIC EXERCISES: CPT

## 2023-12-22 NOTE — PROGRESS NOTES
Gerardo Lozano  : 2000  [facility]  Secondary:  [address] @ [employer] [employer]  [address]   [address] [facility] [address]  Phone: [phone]  Fax: [phone] Plan Frequency: 2-4 additional visits    Plan of Care/Certification Expiration Date: 24      >PT Visit Info:  Plan Frequency: 2-4 additional visits  Plan of Care/Certification Expiration Date: 24  Total # of Visits to Date: 8  Progress Note Counter: 8      Visit Count:  9    OUTPATIENT PHYSICAL THERAPY: OP Note Type: Treatment Note 2023       Episode  }Appt Desk             Treatment Diagnosis:  Pain in Right Shoulder (M25.511)  Medical/Referring Diagnosis:  Pain in right shoulder [M25.511]  Other chronic pain [G89.29]  Referring Physician:  Dave Oh MD MD Orders:  PT Eval and Treat   Date of Onset: last 2022  Allergies:   Patient has no allergy information on record. Restrictions/Precautions:  No data recorded  No data recorded   Interventions Planned (Treatment may consist of any combination of the following):    Current Treatment Recommendations: Strengthening; ROM; Home exercise program     >Subjective Comments: Patient reports that he was not able to do exercises much while sick. Has not had any imaging done yet. >Initial:     0 /10 R shoulder    >Post Session:    0 /10     Medications Last Reviewed:  2023    Updated Objective Findings:  Previously. None today. ROM:  PROM RUE (degrees)  R Shoulder Flex  (0-180): 170 (forward elevation)  R Shoulder ABduction (0-180): 100 (with scapula stabilized)  R Shoulder Int Rotation  (0-70): 60 (stabilized at 45 abd, 45 at 90 deg abd). Clunk felt with stabilized IR more at 90 deg abd vs lower angles.   R Shoulder Ext Rotation  (0-90): 80 (in shoulder neutral, 95 at 45 and 90 deg abd)  AROM RUE (degrees)  R Shoulder Flexion (0-180): 165 (forward elevation)  R Shoulder Int Rotation  (0-70): 45 (at 90 deg abd; to T7